# Patient Record
Sex: FEMALE | Race: WHITE | NOT HISPANIC OR LATINO
[De-identification: names, ages, dates, MRNs, and addresses within clinical notes are randomized per-mention and may not be internally consistent; named-entity substitution may affect disease eponyms.]

---

## 2020-05-27 ENCOUNTER — APPOINTMENT (OUTPATIENT)
Dept: OTOLARYNGOLOGY | Facility: CLINIC | Age: 50
End: 2020-05-27
Payer: COMMERCIAL

## 2020-05-27 VITALS — HEIGHT: 64 IN | BODY MASS INDEX: 19.57 KG/M2 | WEIGHT: 114.64 LBS

## 2020-05-27 DIAGNOSIS — H93.292 OTHER ABNORMAL AUDITORY PERCEPTIONS, LEFT EAR: ICD-10-CM

## 2020-05-27 DIAGNOSIS — Z86.39 PERSONAL HISTORY OF OTHER ENDOCRINE, NUTRITIONAL AND METABOLIC DISEASE: ICD-10-CM

## 2020-05-27 DIAGNOSIS — Z78.9 OTHER SPECIFIED HEALTH STATUS: ICD-10-CM

## 2020-05-27 DIAGNOSIS — H61.23 IMPACTED CERUMEN, BILATERAL: ICD-10-CM

## 2020-05-27 DIAGNOSIS — Z80.0 FAMILY HISTORY OF MALIGNANT NEOPLASM OF DIGESTIVE ORGANS: ICD-10-CM

## 2020-05-27 DIAGNOSIS — Z82.3 FAMILY HISTORY OF STROKE: ICD-10-CM

## 2020-05-27 PROCEDURE — 69210 REMOVE IMPACTED EAR WAX UNI: CPT

## 2020-05-27 PROCEDURE — 99203 OFFICE O/P NEW LOW 30 MIN: CPT | Mod: 25

## 2020-05-27 RX ORDER — ASCORBIC ACID 500 MG
TABLET ORAL
Refills: 0 | Status: ACTIVE | COMMUNITY

## 2020-05-27 RX ORDER — DEXTROAMPHETAMINE SACCHARATE, AMPHETAMINE ASPARTATE, DEXTROAMPHETAMINE SULFATE, AND AMPHETAMINE SULFATE 3.75; 3.75; 3.75; 3.75 MG/1; MG/1; MG/1; MG/1
TABLET ORAL
Refills: 0 | Status: ACTIVE | COMMUNITY

## 2020-05-27 RX ORDER — ZINC OXIDE 13 %
CREAM (GRAM) TOPICAL
Refills: 0 | Status: ACTIVE | COMMUNITY

## 2020-05-27 RX ORDER — CALCIUM CARBONATE/VITAMIN D3 600 MG-10
TABLET ORAL
Refills: 0 | Status: ACTIVE | COMMUNITY

## 2020-05-27 NOTE — CONSULT LETTER
[Please see my note below.] : Please see my note below. [FreeTextEntry2] : Dear KAREN RAMIREZ  [FreeTextEntry1] : Thank you for allowing me to participate in the care of GERSON DAO .\par Please see the attached visit note.\par \par \par \par Dariusz Ceballos\par Otology\par Department of Otolaryngology\par [facility]

## 2020-05-27 NOTE — ASSESSMENT
[FreeTextEntry1] : Ear hygiene reviewed in detail.  Follow up recommended if symptoms persist or progresses.  Routine follow up for cerumen management suggested.\par \par We discussed the risks of an office visit at this time during the Covid Pandemic. I have recommended that the patient followup with me if  health significantly changes.

## 2020-05-27 NOTE — HISTORY OF PRESENT ILLNESS
[de-identified] : GERSON DAO has a history of hearing loss in the left ear for about 3 weeks. Patient reports of decreased hearing in her left ear as well. No pain and no otorrhea reported.

## 2020-07-02 ENCOUNTER — RESULT REVIEW (OUTPATIENT)
Age: 50
End: 2020-07-02

## 2021-02-17 ENCOUNTER — NON-APPOINTMENT (OUTPATIENT)
Age: 51
End: 2021-02-17

## 2021-02-17 ENCOUNTER — APPOINTMENT (OUTPATIENT)
Dept: OTOLARYNGOLOGY | Facility: CLINIC | Age: 51
End: 2021-02-17
Payer: COMMERCIAL

## 2021-02-17 VITALS — WEIGHT: 114 LBS | BODY MASS INDEX: 19.46 KG/M2 | TEMPERATURE: 98 F | HEIGHT: 64 IN

## 2021-02-17 PROCEDURE — 92557 COMPREHENSIVE HEARING TEST: CPT

## 2021-02-17 PROCEDURE — 92504 EAR MICROSCOPY EXAMINATION: CPT

## 2021-02-17 PROCEDURE — 99215 OFFICE O/P EST HI 40 MIN: CPT | Mod: 25

## 2021-02-17 PROCEDURE — 99072 ADDL SUPL MATRL&STAF TM PHE: CPT

## 2021-02-17 PROCEDURE — 92550 TYMPANOMETRY & REFLEX THRESH: CPT

## 2021-02-17 NOTE — CONSULT LETTER
[Please see my note below.] : Please see my note below. [FreeTextEntry2] : Dear KAREN RAMIREZ  [FreeTextEntry1] : Thank you for allowing me to participate in the care of GERSON DAO .\par Please see the attached visit note.\par \par \par \par Dariusz Ceballos\par Otology\par Medical Director of [facility]\par Department of Otolaryngology\par [facility]

## 2021-02-17 NOTE — REASON FOR VISIT
[Subsequent Evaluation] : a subsequent evaluation for [FreeTextEntry2] : Ear Fullness and dizziness

## 2021-02-17 NOTE — PHYSICAL EXAM
[Normal] : temporomandibular joint is normal [FreeTextEntry1] : Procedure: Microscopic Ear Exam\par \par Left ear:  Ear canal intact without inflammation or lesion.  \par Tympanic membrane intact without inflammation.\par \par Right ear:  Ear canal intact without inflammation or lesion.  \par Tympanic membrane intact without inflammation.\par \par

## 2021-02-17 NOTE — DATA REVIEWED
[de-identified] : Complete audiometry was ordered and completed today. I have interpreted these results and reviewed them in detail with the patient.\par \par

## 2021-02-17 NOTE — HISTORY OF PRESENT ILLNESS
[de-identified] : GERSON DAO has a history of hearing loss in the left ear for about 3 weeks. Patient reports of decreased hearing in her left ear as well. No pain and no otorrhea reported. \par Reports of Covid infection in December 2020, fully recovered.   [FreeTextEntry1] : 02/17/2021\par New onset of hearing loss in the right ear 3 days ago on 2/14/21.  Treated with cerumen removal.  Later developed vertigo which slowly resolved over the past few days.  Loud tinnitus reported.  No intercurrent illness or skin reaction.

## 2021-02-17 NOTE — ASSESSMENT
[FreeTextEntry1] : Sudden evere sensorineural hearing loss of the right ear. I have reviewed the possible etiologies. This is most likely viral in etiology but other etiologies exist. I have discussed management options in detail.\par \par I have recommended MRI of the internal auditory canals.\par \par I have recommended a trial with oral steroids. Risks and benefits discussed in detail.\par \par I have recommended followup in one to 2 weeks with repeat audiometry. May consider intratympanic therapy.

## 2021-03-01 ENCOUNTER — APPOINTMENT (OUTPATIENT)
Dept: MRI IMAGING | Facility: HOSPITAL | Age: 51
End: 2021-03-01

## 2021-03-01 ENCOUNTER — OUTPATIENT (OUTPATIENT)
Dept: OUTPATIENT SERVICES | Facility: HOSPITAL | Age: 51
LOS: 1 days | End: 2021-03-01
Payer: COMMERCIAL

## 2021-03-01 ENCOUNTER — RESULT REVIEW (OUTPATIENT)
Age: 51
End: 2021-03-01

## 2021-03-01 PROCEDURE — 70553 MRI BRAIN STEM W/O & W/DYE: CPT

## 2021-03-01 PROCEDURE — A9585: CPT

## 2021-03-01 PROCEDURE — 70553 MRI BRAIN STEM W/O & W/DYE: CPT | Mod: 26

## 2021-03-03 ENCOUNTER — APPOINTMENT (OUTPATIENT)
Dept: OTOLARYNGOLOGY | Facility: CLINIC | Age: 51
End: 2021-03-03
Payer: COMMERCIAL

## 2021-03-03 VITALS — HEIGHT: 64 IN | BODY MASS INDEX: 19.46 KG/M2 | TEMPERATURE: 97.3 F | WEIGHT: 114 LBS

## 2021-03-03 DIAGNOSIS — H93.299 OTHER ABNORMAL AUDITORY PERCEPTIONS, UNSPECIFIED EAR: ICD-10-CM

## 2021-03-03 PROCEDURE — 99072 ADDL SUPL MATRL&STAF TM PHE: CPT

## 2021-03-03 PROCEDURE — 92550 TYMPANOMETRY & REFLEX THRESH: CPT

## 2021-03-03 PROCEDURE — 92557 COMPREHENSIVE HEARING TEST: CPT

## 2021-03-03 PROCEDURE — 69801 INCISE INNER EAR: CPT

## 2021-03-03 PROCEDURE — 99214 OFFICE O/P EST MOD 30 MIN: CPT | Mod: 25

## 2021-03-03 NOTE — PROCEDURE
[FreeTextEntry1] : Procedure: RIGHT Intratympanic injection\par \par Indication: Sudden Hearing Loss\par \par Management options reviewed in detail. All risks limitations complications and alternatives reviewed with the patient who agreed.\par Injection: dexamethasone 24mg/ml  0.3cc used\par Anesthesia: topical phenol \par Dwell Time: 20 Minutes\par Estimated Blood Loss: None\par Complications: None

## 2021-03-03 NOTE — ASSESSMENT
[FreeTextEntry1] : Persistent severe hearing loss in the right ear following oral steroids. A series of intratympanic steroid injections planned. The first injection was achieved day. And care reviewed. Followup in 5 days.

## 2021-03-03 NOTE — DATA REVIEWED
[de-identified] : Complete audiometry was ordered and completed today. I have interpreted these results and reviewed them in detail with the patient.\par \par

## 2021-03-03 NOTE — HISTORY OF PRESENT ILLNESS
[de-identified] : GERSON DAO has a history of cerumen impaction. Sudden Hearing loss in the right ear on or about 2/14/21 \par Reports of Covid infection in December 2020, fully recovered.   [FreeTextEntry1] : 03/03/2021 \par Patient reports of no vertigo and dizziness. Mild positional vertigo noted. No proceed change in hearing. Compliant with oral medication with no reported adverse events.

## 2021-03-03 NOTE — CONSULT LETTER
[Please see my note below.] : Please see my note below. [FreeTextEntry2] : Dear KAREN RAMIREZ  [FreeTextEntry1] : Thank you for allowing me to participate in the care of GERSON DAO .\par Please see the attached visit note.\par \par \par \par Dariusz Ceballos\par Otology\par Medical Director of [facility]\par Department of Otolaryngology\par [facility]

## 2021-03-08 ENCOUNTER — APPOINTMENT (OUTPATIENT)
Dept: OTOLARYNGOLOGY | Facility: CLINIC | Age: 51
End: 2021-03-08
Payer: COMMERCIAL

## 2021-03-08 VITALS — TEMPERATURE: 94.7 F | WEIGHT: 114 LBS | HEIGHT: 64 IN | BODY MASS INDEX: 19.46 KG/M2

## 2021-03-08 PROCEDURE — 99072 ADDL SUPL MATRL&STAF TM PHE: CPT

## 2021-03-08 PROCEDURE — 99213 OFFICE O/P EST LOW 20 MIN: CPT | Mod: 25

## 2021-03-08 PROCEDURE — 69801 INCISE INNER EAR: CPT

## 2021-03-08 NOTE — PHYSICAL EXAM
[Normal] : temporomandibular joint is normal [FreeTextEntry1] : Procedure: Microscopic Ear Exam\par \par Left ear:  \par Ear canal intact without inflammation or lesion.  \par Tympanic membrane intact without inflammation.\par \par \par Right ear:  \par Ear canal intact without inflammation or lesion.  \par Small perforation remained in the anterior superior quadrant. No inflammation.

## 2021-03-08 NOTE — CONSULT LETTER
[Please see my note below.] : Please see my note below. [FreeTextEntry2] : Dear KAREN RAMIREZ  [FreeTextEntry1] : Thank you for allowing me to participate in the care of GERSON DAO .\par Please see the attached visit note.\par \par \par \par Dariusz Ceballos\par Otology\par Medical Director [facility]\par Department of Otolaryngology\par [facility]

## 2021-03-08 NOTE — HISTORY OF PRESENT ILLNESS
[de-identified] : GERSON DAO has a history of cerumen impaction. Sudden Hearing loss in the right ear on or about 2/14/21 \par Reports of Covid infection in December 2020, fully recovered.   [FreeTextEntry1] : 03/08/2021 \par Patient is being seen for Sudden hearing loss in the right ear. The patient has completed oral steroids without adverse effect. She reports perceived change in hearing. Tinnitus persists. No vertigo.

## 2021-03-08 NOTE — ASSESSMENT
[FreeTextEntry1] : Persistent sudden hearing loss in the right ear treated today with second intratympanic steroid injection. Close monitoring with further intratympanic therapy to be considered. Followup arranged.

## 2021-03-10 ENCOUNTER — APPOINTMENT (OUTPATIENT)
Dept: OTOLARYNGOLOGY | Facility: CLINIC | Age: 51
End: 2021-03-10
Payer: COMMERCIAL

## 2021-03-10 ENCOUNTER — TRANSCRIPTION ENCOUNTER (OUTPATIENT)
Age: 51
End: 2021-03-10

## 2021-03-10 VITALS — WEIGHT: 114 LBS | TEMPERATURE: 97.3 F | HEIGHT: 64 IN | BODY MASS INDEX: 19.46 KG/M2

## 2021-03-10 PROCEDURE — 99072 ADDL SUPL MATRL&STAF TM PHE: CPT

## 2021-03-10 PROCEDURE — 69801 INCISE INNER EAR: CPT

## 2021-03-10 PROCEDURE — 99213 OFFICE O/P EST LOW 20 MIN: CPT | Mod: 25

## 2021-03-10 RX ORDER — SCOPOLAMINE 1.5 MG/1
1 PATCH, EXTENDED RELEASE TRANSDERMAL
Qty: 4 | Refills: 0 | Status: ACTIVE | COMMUNITY
Start: 2021-03-10 | End: 1900-01-01

## 2021-03-10 NOTE — HISTORY OF PRESENT ILLNESS
[de-identified] : GERSON DAO has a history of cerumen impaction. Sudden Hearing loss in the right ear on or about 2/14/21 \par Reports of Covid infection in December 2020, fully recovered.   [FreeTextEntry1] : 03/10/2021 \par Patient is being seen for evaluation for hearing loss. She is not aware of significant changes in her hearing. She has had increased since of imbalance. It has not interfered with her daily activities.

## 2021-03-10 NOTE — ASSESSMENT
[FreeTextEntry1] : Completed 3 intratympanic injections. I have reviewed wound care with the patient in detail and a provided a transdermal patch for dizziness. The patient is scheduled to go on a boat trip. I have recommended followup in approximately 3 weeks with repeat audiometry.

## 2021-03-10 NOTE — PHYSICAL EXAM
[Normal] : temporomandibular joint is normal [FreeTextEntry1] : Procedure: Microscopic Ear Exam\par \par Left ear:  \par Ear canal intact without inflammation or lesion.  \par Tympanic membrane intact without inflammation.\par \par \par Right ear:  \par Ear canal intact without inflammation or lesion.  \par small puncture remains from prior injection. No inflammation.

## 2021-03-10 NOTE — CONSULT LETTER
[Please see my note below.] : Please see my note below. [FreeTextEntry2] : Dear KAREN RAMIREZ  [FreeTextEntry1] : Thank you for allowing me to participate in the care of GERSON DAO .\par Please see the attached visit note.\par \par \par \par Dariusz Ceballos\par Otology\par Medical Director of [facility]\par Department of Otolaryngology\par [facility]

## 2021-04-06 ENCOUNTER — APPOINTMENT (OUTPATIENT)
Dept: OTOLARYNGOLOGY | Facility: CLINIC | Age: 51
End: 2021-04-06
Payer: COMMERCIAL

## 2021-04-06 DIAGNOSIS — H83.2X1 LABYRINTHINE DYSFUNCTION, RIGHT EAR: ICD-10-CM

## 2021-04-06 PROCEDURE — 92504 EAR MICROSCOPY EXAMINATION: CPT

## 2021-04-06 PROCEDURE — 99213 OFFICE O/P EST LOW 20 MIN: CPT | Mod: 25

## 2021-04-06 PROCEDURE — 92557 COMPREHENSIVE HEARING TEST: CPT

## 2021-04-06 PROCEDURE — 99072 ADDL SUPL MATRL&STAF TM PHE: CPT

## 2021-04-06 PROCEDURE — 92567 TYMPANOMETRY: CPT

## 2021-04-06 NOTE — PHYSICAL EXAM
[FreeTextEntry1] : Procedure: Microscopic Ear Exam\par \par Left ear:  \par Ear canal intact without inflammation or lesion.  \par Tympanic membrane intact without inflammation.\par \par \par Right ear:  \par Ear canal intact without inflammation or lesion.  \par Small tympanic membrane perforation remains. No inflammation. [Normal] : no rashes

## 2021-04-06 NOTE — DATA REVIEWED
[de-identified] : Complete audiometry was ordered and completed today. I have interpreted these results and reviewed them in detail with the patient.\par \par moderate to profound hearing loss in the right ear remains

## 2021-04-06 NOTE — HISTORY OF PRESENT ILLNESS
[de-identified] : GERSON DAO has a history of cerumen impaction. Sudden Hearing loss in the right ear on or about 2/14/21 \par Reports of Covid infection in December 2020, fully recovered.   [FreeTextEntry1] : 04/06/2021 \par Patient is being seen for evaluation for hearing loss. Feels that hearing is better.  Tinnitus persists.  Imbalance reported and is somewhat interfering with daily life.  This includes occasional positional vertigo.  No ear pain.

## 2021-07-14 ENCOUNTER — APPOINTMENT (OUTPATIENT)
Dept: OTOLARYNGOLOGY | Facility: CLINIC | Age: 51
End: 2021-07-14
Payer: COMMERCIAL

## 2021-07-14 VITALS — TEMPERATURE: 98 F | BODY MASS INDEX: 20.32 KG/M2 | WEIGHT: 119.05 LBS | HEIGHT: 64 IN

## 2021-07-14 PROCEDURE — 92557 COMPREHENSIVE HEARING TEST: CPT

## 2021-07-14 PROCEDURE — 99072 ADDL SUPL MATRL&STAF TM PHE: CPT

## 2021-07-14 PROCEDURE — G0268 REMOVAL OF IMPACTED WAX MD: CPT

## 2021-07-14 PROCEDURE — 92567 TYMPANOMETRY: CPT

## 2021-07-14 PROCEDURE — 99213 OFFICE O/P EST LOW 20 MIN: CPT | Mod: 25

## 2021-07-14 RX ORDER — PREDNISONE 10 MG/1
10 TABLET ORAL
Qty: 60 | Refills: 0 | Status: COMPLETED | COMMUNITY
Start: 2021-02-17 | End: 2021-07-14

## 2021-07-14 NOTE — CONSULT LETTER
[FreeTextEntry2] : Dear KAREN RAMIREZ  [FreeTextEntry1] : Thank you for allowing me to participate in the care of GERSON DAO .\par Please see the attached visit note.\par \par \par \par Dariusz Ceballos\par Otology\par Medical Director of [facility]\par Department of Otolaryngology\par [facility]

## 2021-07-14 NOTE — DATA REVIEWED
[de-identified] : In order to investigate current symptoms, Complete audiometry was ordered and completed today. I have interpreted these results and reviewed them in detail with the patient.\par \par  [de-identified] : Audiometry from [place] dated June 2021 provided been reviewed. Hearing thresholds appeared to have improved in the right ear. Speech recognition scores are not provided.

## 2021-07-14 NOTE — ASSESSMENT
[FreeTextEntry1] : Improved low-frequency hearing in the right ear with continued poor speech recognition.\par \par I have reviewed these findings with the patient in detail. I have discussed the options related to hearing rehabilitation. This includes cochlear implantation, BAHA implant, cros hearing technology, and observation.\par \par  I have recommended continued clinical monitoring.

## 2021-07-14 NOTE — HISTORY OF PRESENT ILLNESS
[de-identified] : GERSON DAO has a history of cerumen impaction. Sudden Hearing loss in the right ear on or about 2/14/21 \par Reports of Covid infection in December 2020, fully recovered.   [FreeTextEntry1] : 07/14/2021 \par Patient is being seen for evaluation for hearing loss.  Some improvement reported. no vertigo reported. no ear pain reported.

## 2021-11-10 ENCOUNTER — APPOINTMENT (OUTPATIENT)
Dept: OTOLARYNGOLOGY | Facility: CLINIC | Age: 51
End: 2021-11-10
Payer: COMMERCIAL

## 2021-11-10 DIAGNOSIS — H90.41 SENSORINEURAL HEARING LOSS, UNILATERAL, RIGHT EAR, WITH UNRESTRICTED HEARING ON THE CONTRALATERAL SIDE: ICD-10-CM

## 2021-11-10 DIAGNOSIS — H61.20 IMPACTED CERUMEN, UNSPECIFIED EAR: ICD-10-CM

## 2021-11-10 DIAGNOSIS — H91.20 SUDDEN IDIOPATHIC HEARING LOSS, UNSPECIFIED EAR: ICD-10-CM

## 2021-11-10 PROCEDURE — 99213 OFFICE O/P EST LOW 20 MIN: CPT | Mod: 25

## 2021-11-10 PROCEDURE — 92557 COMPREHENSIVE HEARING TEST: CPT

## 2021-11-10 PROCEDURE — 92567 TYMPANOMETRY: CPT

## 2021-11-10 NOTE — PHYSICAL EXAM
[Normal] : temporomandibular joint is normal [FreeTextEntry1] : Microscopic ear exam with cerumen debridement:\par \par Right ear: The ear canal was patent and nonobstructed.  The tympanic membrane was intact and noninflamed.\par \par Left ear: Obstructing cerumen was debrided from the ear canal using suction, and curet.  The ear canal was within normal limits.  The tympanic membrane was intact and noninflamed.

## 2021-11-10 NOTE — HISTORY OF PRESENT ILLNESS
[de-identified] : GERSON DAO has a history of cerumen impaction. Sudden Hearing loss in the right ear on or about 2/14/21 \par Reports of Covid infection in December 2020, fully recovered.   [FreeTextEntry1] : 11/10/2021 \par No reported perceived change in hearing. In this process. No vertigo. Mild imbalance reported. no

## 2021-11-10 NOTE — DATA REVIEWED
[de-identified] : To investigate perceived hearing loss, Complete audiometry was ordered and completed today. I have interpreted these results and reviewed them in detail with the patient.\par \par severe hearing loss, right ear with poor speech recognition

## 2021-11-10 NOTE — ASSESSMENT
[FreeTextEntry1] : Persistent severe sensorineural hearing loss in the right ear, with some recovery of the low-frequency sounds. Speech recognition remains poor. I have reviewed this with the patient in detail. We discussed management options, including the use of amplification. Cochlear implantation may be beneficial.\par \par The patient is moving to [place]. I have recommended consultation with a local specialist.

## 2021-11-10 NOTE — CONSULT LETTER
[Please see my note below.] : Please see my note below. [FreeTextEntry2] : Dear KAREN RAMIREZ  [FreeTextEntry1] : Thank you for allowing me to participate in the care of GERSON DAO .\par Please see the attached visit note.\par \par \par \par Dariusz Ceballos\par Otology\par Medical Director of [facility]\par Department of Otolaryngology\par [facility]
